# Patient Record
Sex: MALE | Race: WHITE | NOT HISPANIC OR LATINO
[De-identification: names, ages, dates, MRNs, and addresses within clinical notes are randomized per-mention and may not be internally consistent; named-entity substitution may affect disease eponyms.]

---

## 2017-03-29 ENCOUNTER — APPOINTMENT (OUTPATIENT)
Dept: ORTHOPEDIC SURGERY | Facility: CLINIC | Age: 62
End: 2017-03-29

## 2018-05-14 ENCOUNTER — TELEPHONE (OUTPATIENT)
Dept: COLORECTAL SURGERY | Facility: CLINIC | Age: 63
End: 2018-05-14

## 2018-05-14 NOTE — TELEPHONE ENCOUNTER
"Spoke with pt who reports having leakage of stool at night, usually when he drinks alcohol or eats rich food. Pt is not taking cholestyramine, lomotil or fiber supplement regularly. Pt says his BM's are \"all over the place\". Advised pt to try to stick to a regimen and try cholestyramine at least once daily. Advised he could mention this to Dr Whaley at his upcoming C and to call office as needed.    "

## 2018-06-20 ENCOUNTER — ANESTHESIA (OUTPATIENT)
Dept: ENDOSCOPY | Facility: HOSPITAL | Age: 63
End: 2018-06-20
Payer: COMMERCIAL

## 2018-06-20 ENCOUNTER — ANESTHESIA EVENT (OUTPATIENT)
Dept: ENDOSCOPY | Facility: HOSPITAL | Age: 63
End: 2018-06-20
Payer: COMMERCIAL

## 2018-06-20 ENCOUNTER — HOSPITAL ENCOUNTER (OUTPATIENT)
Facility: HOSPITAL | Age: 63
Discharge: HOME | End: 2018-06-20
Attending: COLON & RECTAL SURGERY | Admitting: COLON & RECTAL SURGERY
Payer: COMMERCIAL

## 2018-06-20 VITALS
HEIGHT: 70 IN | TEMPERATURE: 97.5 F | RESPIRATION RATE: 35 BRPM | HEART RATE: 80 BPM | DIASTOLIC BLOOD PRESSURE: 75 MMHG | BODY MASS INDEX: 26.2 KG/M2 | SYSTOLIC BLOOD PRESSURE: 115 MMHG | WEIGHT: 183 LBS | OXYGEN SATURATION: 97 %

## 2018-06-20 PROBLEM — Z85.048 HISTORY OF RECTAL CANCER: Status: ACTIVE | Noted: 2018-06-20

## 2018-06-20 PROCEDURE — 25000000 HC PHARMACY GENERAL: Performed by: NURSE ANESTHETIST, CERTIFIED REGISTERED

## 2018-06-20 PROCEDURE — 45378 DIAGNOSTIC COLONOSCOPY: CPT | Performed by: COLON & RECTAL SURGERY

## 2018-06-20 PROCEDURE — 200200 PR NO CHARGE: Performed by: COLON & RECTAL SURGERY

## 2018-06-20 PROCEDURE — 63600000 HC DRUGS/DETAIL CODE: Performed by: NURSE ANESTHETIST, CERTIFIED REGISTERED

## 2018-06-20 PROCEDURE — 37000002 HC ANESTHESIA MAC: Performed by: COLON & RECTAL SURGERY

## 2018-06-20 PROCEDURE — 75000014 HC COLONSCOPY DIAGNOSTIC: Performed by: COLON & RECTAL SURGERY

## 2018-06-20 PROCEDURE — 0DJD8ZZ INSPECTION OF LOWER INTESTINAL TRACT, VIA NATURAL OR ARTIFICIAL OPENING ENDOSCOPIC: ICD-10-PCS | Performed by: COLON & RECTAL SURGERY

## 2018-06-20 PROCEDURE — 25800000 HC PHARMACY IV SOLUTIONS: Performed by: NURSE ANESTHETIST, CERTIFIED REGISTERED

## 2018-06-20 RX ORDER — IBUPROFEN 200 MG
16-32 TABLET ORAL AS NEEDED
Status: DISCONTINUED | OUTPATIENT
Start: 2018-06-20 | End: 2018-06-20 | Stop reason: HOSPADM

## 2018-06-20 RX ORDER — DEXTROSE 50 % IN WATER (D50W) INTRAVENOUS SYRINGE
25 AS NEEDED
Status: DISCONTINUED | OUTPATIENT
Start: 2018-06-20 | End: 2018-06-20 | Stop reason: HOSPADM

## 2018-06-20 RX ORDER — SODIUM CHLORIDE 9 MG/ML
INJECTION, SOLUTION INTRAVENOUS CONTINUOUS
Status: DISCONTINUED | OUTPATIENT
Start: 2018-06-20 | End: 2018-06-20 | Stop reason: HOSPADM

## 2018-06-20 RX ORDER — PROPOFOL 200MG/20ML
SYRINGE (ML) INTRAVENOUS AS NEEDED
Status: DISCONTINUED | OUTPATIENT
Start: 2018-06-20 | End: 2018-06-20 | Stop reason: SURG

## 2018-06-20 RX ORDER — SODIUM CHLORIDE 9 MG/ML
80 INJECTION, SOLUTION INTRAVENOUS CONTINUOUS
Status: DISCONTINUED | OUTPATIENT
Start: 2018-06-20 | End: 2018-06-20 | Stop reason: HOSPADM

## 2018-06-20 RX ORDER — DEXTROSE 40 %
15-30 GEL (GRAM) ORAL AS NEEDED
Status: DISCONTINUED | OUTPATIENT
Start: 2018-06-20 | End: 2018-06-20 | Stop reason: HOSPADM

## 2018-06-20 RX ORDER — SODIUM CHLORIDE 9 MG/ML
INJECTION, SOLUTION INTRAVENOUS CONTINUOUS PRN
Status: DISCONTINUED | OUTPATIENT
Start: 2018-06-20 | End: 2018-06-20 | Stop reason: SURG

## 2018-06-20 RX ORDER — PROPOFOL 10 MG/ML
INJECTION, EMULSION INTRAVENOUS CONTINUOUS PRN
Status: DISCONTINUED | OUTPATIENT
Start: 2018-06-20 | End: 2018-06-20 | Stop reason: SURG

## 2018-06-20 RX ORDER — LIDOCAINE HYDROCHLORIDE 10 MG/ML
INJECTION, SOLUTION EPIDURAL; INFILTRATION; INTRACAUDAL; PERINEURAL AS NEEDED
Status: DISCONTINUED | OUTPATIENT
Start: 2018-06-20 | End: 2018-06-20 | Stop reason: SURG

## 2018-06-20 RX ADMIN — PROPOFOL 50 MG: 10 INJECTION, EMULSION INTRAVENOUS at 07:40

## 2018-06-20 RX ADMIN — PROPOFOL 50 MG: 10 INJECTION, EMULSION INTRAVENOUS at 07:38

## 2018-06-20 RX ADMIN — LIDOCAINE HYDROCHLORIDE 5 ML: 10 INJECTION, SOLUTION EPIDURAL; INFILTRATION; INTRACAUDAL; PERINEURAL at 07:38

## 2018-06-20 RX ADMIN — SODIUM CHLORIDE: 9 INJECTION, SOLUTION INTRAVENOUS at 07:33

## 2018-06-20 RX ADMIN — PROPOFOL 50 MG: 10 INJECTION, EMULSION INTRAVENOUS at 07:39

## 2018-06-20 RX ADMIN — PROPOFOL 175 MCG/KG/MIN: 10 INJECTION, EMULSION INTRAVENOUS at 07:39

## 2018-06-20 ASSESSMENT — ENCOUNTER SYMPTOMS
FEVER: 0
DIFFICULTY URINATING: 0
ABDOMINAL PAIN: 0
HEADACHES: 0
CONFUSION: 0
SHORTNESS OF BREATH: 0
CHILLS: 0

## 2018-06-20 ASSESSMENT — PAIN SCALES - GENERAL: PAIN_LEVEL: 0

## 2018-06-20 ASSESSMENT — PAIN - FUNCTIONAL ASSESSMENT
PAIN_FUNCTIONAL_ASSESSMENT: UNABLE TO ASSESS
PAIN_FUNCTIONAL_ASSESSMENT: NO/DENIES PAIN
PAIN_FUNCTIONAL_ASSESSMENT: NO/DENIES PAIN

## 2018-06-20 NOTE — ANESTHESIA PREPROCEDURE EVALUATION
Anesthesia ROS/MED HX      Cardiovascular   hypertension  GI/Hepatic   GERD  Endo/Other   History of cancer (rectal  cancer )  ROS/MED HX Comments:    GI/Hepatic/Renal: Hx rectal cancer , s/p resection     IBS       Past Surgical History:   Procedure Laterality Date   • COLON SURGERY  07/14/2005   • COLON SURGERY  10/10/2005    closure of loop transverse colostomy with resection of portion of transverse colon.   • COLON SURGERY  08/23/2006   • COLON SURGERY  08/08/2007   • COLON SURGERY  08/13/2008   • COLON SURGERY  08/12/2009   • COLON SURGERY  03/16/2011   • COLON SURGERY  10/31/2011    excision of hemorrhoids, flexible sigmoidscopy   • COLON SURGERY  04/03/2013   • COLON SURGERY  04/08/2015   • COLONOSCOPY         Physical Exam    Airway   Mallampati: III   TM distance: >3 FB   Neck ROM: full  Cardiovascular    Rhythm: regular   Rate: normal  Pulmonary    Decreased breath sounds  Dental - normal        Anesthesia Plan    Plan: MAC    Technique: MAC     Airway: natural airway / supplemental oxygen   ASA 3  Anesthetic plan and risks discussed with: patient  Induction:    intravenous   Parents Present: No  Postop Plan:   Patient Disposition: phase II then home   Pain Management: IV analgesics      Past Medical History:   Diagnosis Date   • GERD (gastroesophageal reflux disease)    • Hypertension    • IBS (irritable bowel syndrome)    • Rectal cancer (CMS/HCC) (HCC) 2008    chemo

## 2018-06-20 NOTE — POST-PROCEDURE NOTE
Preoperative diagnosis: T3 adenocarcinoma the rectum 1 cm posterior status post preoperative chemoradiation (5040 cGy) YPT2N0 lesion.    Postoperative diagnosis: Same no evidence of disease    Surgeon: Angel Whaley MD    Procedure: Colonoscopy based cecum    DD: 2    Findings: Normal coloanal anastomosis; normal colocolonic anastomosis    Followup: Follow-up in the office in one years time repeat colonoscopy in 3 years to

## 2018-06-20 NOTE — ANESTHESIA POSTPROCEDURE EVALUATION
Patient: Sumit Davis    Procedure Summary     Date:  06/20/18 Room / Location:  [facility] (D) / [facility]    Anesthesia Start:  0734 Anesthesia Stop:  0757    Procedure:  colonoscopy (N/A Anus) Diagnosis:       Encounter for screening colonoscopy      (screening)    Provider:  Angel Whaley MD Responsible Provider:  Wander Lopez MD    Anesthesia Type:  MAC ASA Status:  3          Anesthesia Type: MAC  PACU Vitals  6/20/2018 0752 - 6/20/2018 0805      6/20/2018 0755             BP: 94/60    Temp: 36.4 °C (97.5 °F)    Pulse: 80    Resp: 17    SpO2: 99 %            Anesthesia Post Evaluation    Pain score: 0  Pain management: satisfactory to patient  Mode of pain management: IV medication  Patient location during evaluation: PACU  Patient participation: complete - patient participated  Level of consciousness: awake and alert  Cardiovascular status: acceptable  Airway Patency: adequate  Respiratory status: acceptable  Hydration status: stable  Anesthetic complications: no

## 2018-06-20 NOTE — OP NOTE
_______________________________________________________________________________  Patient Name: Sumit Davis         Procedure Date: 6/20/2018 7:28 AM  MRN: [id number]                     Account Number: [id number]  YOB: 1955              Age: 63  Gender: Male                          Note Status: Finalized  Attending MD: SARAHI MELÉNDEZ MD  _______________________________________________________________________________  Procedure:           Colonoscopy  Indications:         Personal history of malignant rectal neoplasm  Providers:           Bj Schuster RN (Nurse), SARAHI MELÉNDEZ MD (Doctor)  Referring MD:  Requesting Provider:  Medicines:           Monitored Anesthesia Care  Complications:       No immediate complications.  _______________________________________________________________________________  Procedure:           After I obtained informed consent, the scope was passed  under direct vision. Throughout the procedure, the  patient's blood pressure, pulse, and oxygen saturations  were monitored continuously. The Colonoscope was  introduced through the anus and advanced to the cecum,  identified by appendiceal orifice and ileocecal valve.  The colonoscopy was performed without difficulty. The  patient tolerated the procedure well. The quality of the  bowel preparation was good.  Findings:  coloanal Anastomosis Normal without evidence of recurrence  transverse colon Anastomosis Normal without evidence of recurrence  The entire examined colon appeared normal.  Impression:          - The entire examined colon is normal.  - No specimens collected.  Recommendation:      - Discharge patient to home.  - Return to my office in 1 year.  Procedure Code(s):   --- Professional ---  89041, Colonoscopy, flexible; diagnostic, including  collection of specimen(s) by brushing or washing, when  performed (separate procedure)  Diagnosis Code(s):   --- Professional ---  Z85.048, Personal history of other  malignant neoplasm of  rectum, rectosigmoid junction, and anus  CPT copyright 2015 [employer]. All rights reserved.  The codes documented in this report are preliminary and upon  review may  be revised to meet current compliance requirements.  Attending Participation:  I personally performed the entire procedure.  Sarahi Meléndez MD  ________________  SARAHI MELÉNDEZ MD  6/20/2018 8:03:28 AM  Number of Addenda: 0  Note Initiated On: 6/20/2018 7:28 AM

## 2018-06-20 NOTE — H&P
HISTORY & PHYSICAL EXAM  CC: Colonoscopy for history of rectal cancer    HPI: History of ROSA in 2005 - ypT2N0. Last colonoscopy in 2015 - had no polyps at that point. Has some issues with control. Not taking any fiber or lomotil or other similar agents. Prefers not to take these. Has anywhere from 0 to 7 bowel movements per day.     Past Medical History:   Diagnosis Date   • GERD (gastroesophageal reflux disease)    • Hypertension    • IBS (irritable bowel syndrome)    • Rectal cancer (CMS/HCC) (HCC) 2008    chemo        Past Surgical History:   Procedure Laterality Date   • COLON SURGERY  07/14/2005   • COLON SURGERY  10/10/2005    closure of loop transverse colostomy with resection of portion of transverse colon.   • COLON SURGERY  08/23/2006   • COLON SURGERY  08/08/2007   • COLON SURGERY  08/13/2008   • COLON SURGERY  08/12/2009   • COLON SURGERY  03/16/2011   • COLON SURGERY  10/31/2011    excision of hemorrhoids, flexible sigmoidscopy   • COLON SURGERY  04/03/2013   • COLON SURGERY  04/08/2015   • COLONOSCOPY       No current facility-administered medications for this encounter.    Allergies   Allergen Reactions   • Aspirin Hives     Social History     Social History   • Marital status:      Spouse name: N/A   • Number of children: N/A   • Years of education: N/A     Occupational History   • Not on file.     Social History Main Topics   • Smoking status: Never Smoker   • Smokeless tobacco: Never Used   • Alcohol use Yes      Comment: socially   • Drug use: No   • Sexual activity: Not on file     Other Topics Concern   • Not on file     Social History Narrative   • No narrative on file     History reviewed. No pertinent family history.  Review of Systems   Constitutional: Negative for chills and fever.   HENT: Negative for dental problem.    Respiratory: Negative for shortness of breath.    Cardiovascular: Negative for chest pain.   Gastrointestinal: Negative for abdominal pain.   Genitourinary:  Negative for difficulty urinating.   Musculoskeletal: Negative for gait problem.   Skin: Negative for rash.   Neurological: Negative for headaches.   Psychiatric/Behavioral: Negative for confusion.     Physical Exam   Constitutional: He is oriented to person, place, and time. He appears well-developed and well-nourished.   HENT:   Head: Normocephalic and atraumatic.   Eyes: No scleral icterus.   Cardiovascular: Normal rate, regular rhythm, normal heart sounds and intact distal pulses.    No murmur heard.  Pulmonary/Chest: Effort normal and breath sounds normal. No respiratory distress. He has no wheezes. He has no rales.   Abdominal: Soft. He exhibits no distension.   Musculoskeletal: He exhibits no edema.   Neurological: He is alert and oriented to person, place, and time.   Skin: Skin is warm and dry.   Psychiatric: He has a normal mood and affect.       XRAYS: None    LABS: CEA < 2 in October 2017. (we do not have records of this - per patient)     ASSESSMENT/PLAN:   Ready for colonoscopy.  Indication is rectal cancer history.    I, Lenard Graves MD, am scribing for, and in the presence of MD Lenard Saavedra MD  [facility] Colorectal Clinical Fellow

## 2020-10-15 ENCOUNTER — TELEPHONE (OUTPATIENT)
Dept: COLORECTAL SURGERY | Facility: CLINIC | Age: 65
End: 2020-10-15

## 2020-10-15 NOTE — TELEPHONE ENCOUNTER
Received voicemail message from patient's wife requesting a call back.  Spoke to patient's wife on the phone who states she is upset patient has been rescheduled from Dr Whaley' schedule to Dr Pitts's schedule because patient has a history with Dr Whaley and patient does not know Dr Pitts.  Patient's wife informed Dr Lord is Dr Whaley' partner and is very good but will discuss above message with doctors and our PA and will get back to her regarding patient's appointment. Patient's wife verbalized understanding and thanked me for the call.

## 2020-10-16 NOTE — TELEPHONE ENCOUNTER
Message relayed to FELIBERTO Lisa, regarding patient would like to be scheduled with Dr Whaley. Per FELIBERTO Lisa, patient should be offered new appointment with Dr Whaley.  Message relayed to  to call patient's wife to reschedule with Dr Whaley.  Per , patient's wife was called and offered new appointment with Dr Whaley but patient's wife states patient unable to make that appointment and would like to keep scheduled appointment with Dr Pitts on 11/2/2020.

## 2020-10-29 ENCOUNTER — DOCUMENTATION (OUTPATIENT)
Dept: COLORECTAL SURGERY | Facility: CLINIC | Age: 65
End: 2020-10-29

## 2020-10-29 NOTE — PROGRESS NOTES
1      2             3              4             5     LAST APPOINTMENT:  04/08/2015     TIME ARRIVED:     TIME ROOMED:    VISIT TYPE:   NP       POV     EPV     DISC:             YES         NO                                   PREPPED:  YES  NO     LABS:  [facility]  [facility]  [facility]  OTHER      PHARMACY ENTERED:  YES   NO     DIAGNOSIS:  Rectal cancer  SURGERY                                                                                           DATE OF SURGERY     PATHOLOGY STAGE:   RADIATION :    Yes   No                                           DOSAGE:     LOCATION:   Anterior   Right Lateral   Left Lateral   Posterior   Circumferential     LEVEL:                        SIZE:    CT SCAN:   FFC:  06/20/2018  FFS:    CEA:    PET:    MRI:      OTHER:       FIBER:  NO  METAMUCIL  KONSYL  CITYRUCEL   FIBERCON  BENEFIBER OTHER  LOMOTIL:    NO    1  VS   2    QD    BID    TID    QID  ZANTAC:       Y / N  AMPHOGEL:    Y / N                                                                          START / STOP                                                 RAD ONC:                                  N / A  MED ONC:                               N / A

## 2020-11-02 ENCOUNTER — OFFICE VISIT (OUTPATIENT)
Dept: COLORECTAL SURGERY | Facility: CLINIC | Age: 65
End: 2020-11-02
Payer: COMMERCIAL

## 2020-11-02 ENCOUNTER — LAB REQUISITION (OUTPATIENT)
Dept: LAB | Facility: HOSPITAL | Age: 65
End: 2020-11-02
Attending: COLON & RECTAL SURGERY
Payer: COMMERCIAL

## 2020-11-02 VITALS
DIASTOLIC BLOOD PRESSURE: 92 MMHG | HEIGHT: 70 IN | BODY MASS INDEX: 27.2 KG/M2 | TEMPERATURE: 98 F | WEIGHT: 190 LBS | SYSTOLIC BLOOD PRESSURE: 138 MMHG

## 2020-11-02 DIAGNOSIS — Z85.048 PERSONAL HISTORY OF OTHER MALIGNANT NEOPLASM OF RECTUM, RECTOSIGMOID JUNCTION, AND ANUS: ICD-10-CM

## 2020-11-02 DIAGNOSIS — Z85.048 HISTORY OF RECTAL CANCER: Primary | ICD-10-CM

## 2020-11-02 PROCEDURE — 99214 OFFICE O/P EST MOD 30 MIN: CPT | Mod: 25 | Performed by: COLON & RECTAL SURGERY

## 2020-11-02 PROCEDURE — 45331 SIGMOIDOSCOPY AND BIOPSY: CPT | Performed by: COLON & RECTAL SURGERY

## 2020-11-02 PROCEDURE — 88305 TISSUE EXAM BY PATHOLOGIST: CPT | Performed by: COLON & RECTAL SURGERY

## 2020-11-02 RX ORDER — ICOSAPENT ETHYL 1000 MG/1
1 CAPSULE ORAL
COMMUNITY
Start: 2020-10-07

## 2020-11-02 NOTE — PROGRESS NOTES
HISTORY & PHYSICAL EXAM    HPI: Patient to be seen today in the office for surveillance.  Is been doing very well since his laparoscopic tot with the colonic pouch anal anastomosis done in 2005.  He had a diverting transverse loop colostomy which was later reversed and later 2005.  He said colonoscopies every 3 years since he has been doing well.  He has not been seen in a couple of years time.  His bowels are exactly the way they always have been, he has fairly good control most times.  He is moving his bowels on a daily basis usually.  He has not seen any blood.  He was here today in the presence of his wife.  He was questioning along with his wife regarding the timing of colonoscopies and follow-up visits.    Past Medical History:   Diagnosis Date   • GERD (gastroesophageal reflux disease)    • Hypertension    • IBS (irritable bowel syndrome)    • Rectal cancer (CMS/HCC) 2008    chemo        Past Surgical History:   Procedure Laterality Date   • COLON SURGERY  07/14/2005    ROSA   • COLON SURGERY  10/10/2005    Clousure loop colostomy   • COLON SURGERY  08/23/2006    Colonoscopy   • COLON SURGERY  08/08/2007    Colonoscopy   • COLON SURGERY  08/13/2008    Colonoscopy   • COLON SURGERY  08/12/2009    Colonoscopy   • COLON SURGERY  03/16/2011    Colonoscopy   • COLON SURGERY  10/31/2011    Flexible Sigmoid   • COLON SURGERY  04/03/2013    Colonoscopy   • COLON SURGERY  04/08/2015    Colonoscopy   • COLONOSCOPY         Current Outpatient Medications:   •  dicyclomine (BENTYL) 10 mg capsule, Take 10 mg by mouth 4 (four) times a day (before meals and nightly)., Disp: , Rfl:   •  diphenoxylate-atropine (LOMOTIL) 2.5-0.025 mg per tablet, Take 1 tablet by mouth 4 (four) times a day as needed for diarrhea., Disp: , Rfl:   •  nebivolol (BYSTOLIC) 5 mg tablet, Take 5 mg by mouth daily., Disp: , Rfl:   •  chlorthalidone (HYGROTEN) 25 mg tablet, Take 25 mg by mouth daily., Disp: , Rfl:   •  potassium chloride (KLOR-CON) 10  mEq CR tablet, Take 10 mEq by mouth 2 (two) times a day., Disp: , Rfl:   •  VASCEPA 1 gram capsule, Take 1 g by mouth 2 (two) times a day., Disp: , Rfl:    Allergies   Allergen Reactions   • Aspirin Hives     Social History     Socioeconomic History   • Marital status:      Spouse name: Not on file   • Number of children: Not on file   • Years of education: Not on file   • Highest education level: Not on file   Occupational History   • Not on file   Social Needs   • Financial resource strain: Not on file   • Food insecurity     Worry: Not on file     Inability: Not on file   • Transportation needs     Medical: Not on file     Non-medical: Not on file   Tobacco Use   • Smoking status: Never Smoker   • Smokeless tobacco: Never Used   Substance and Sexual Activity   • Alcohol use: Yes     Comment: socially   • Drug use: No   • Sexual activity: Not on file   Lifestyle   • Physical activity     Days per week: Not on file     Minutes per session: Not on file   • Stress: Not on file   Relationships   • Social connections     Talks on phone: Not on file     Gets together: Not on file     Attends Faith service: Not on file     Active member of club or organization: Not on file     Attends meetings of clubs or organizations: Not on file     Relationship status: Not on file   • Intimate partner violence     Fear of current or ex partner: Not on file     Emotionally abused: Not on file     Physically abused: Not on file     Forced sexual activity: Not on file   Other Topics Concern   • Not on file   Social History Narrative   • Not on file      History reviewed. No pertinent family history.    REVIEW OF SYSTEMS: Unchanged    PHYSICAL EXAM:  GEN: On examination the patient is resting comfortably.  NEURO/PSYCH: Alert and oriented ×3  HEENT: Eyes: Sclera anicteric  CHEST: Equal rise and fall the chest.  No tenderness bilaterally.  SKIN: Warm and moist  NODES: No groin or cervical lymphadenopathy  EXT: No palpable edema  of the bilateral lower extremities.  No clubbing.  GI: Abdomen soft, nontender, nondistended.  No palpable liver or spleen edge.  No ventral hernias, mass, or tenderness.  RECTAL: Perianal skin is normal.  Digital exam reveals normal sphincter tone, the anastomosis palpable, well-healed, without any palpable abnormality.  Flexible sigmoidoscopy: Carried out to the level of the transverse colon at the anastomosis of the stoma closure takedown.  At this point was limited by stool.  The anastomosis appeared normal, widely patent, without abnormality.  Scope was withdrawn looking for any lesions in the very distal rectum there was a polyp seen that was removed with cold forceps polypectomy.  It was 5 mm in size and was sessile.  He tolerated this well and the specimen was sent off the field for pathology.  The anastomosis appeared normal and without any evidence of recurrence.      ASSESSMENT/PLAN:     History of rectal cancer  Patient has a history of rectal cancer in the past I resected 15 years ago.  His most recent colonoscopy was in June 2018 which was normal.  Today he was found to have a polyp in the rectum which I removed with cold forceps polypectomy during the flexible sigmoidoscopy.  Depending on the results of this, will depend on the recommendation for repeat colonoscopy.  5-year follow-up does not seem unreasonable which will be in 2023.  That said, if he had an adenomatous polyp today, I would have him scoped in 2 years time.  He should follow-up in the office in 2 years time.

## 2020-11-02 NOTE — LETTER
November 2, 2020     Bradly Dawson MD  [address]  [place] [address]    Patient: Sumit Davis  YOB: 1955  Date of Visit: 11/2/2020      Dear Dr. Dawson:    Thank you for referring Sumit Davis to me for evaluation. Below are my notes for this consultation.    If you have questions, please do not hesitate to call me. I look forward to following your patient along with you.         Sincerely,        Gabriel Pitts MD        CC: No Recipients  Gabriel Pitts MD  11/2/2020  3:15 PM  Signed  HISTORY & PHYSICAL EXAM    HPI: Patient to be seen today in the office for surveillance.  Is been doing very well since his laparoscopic tot with the colonic pouch anal anastomosis done in 2005.  He had a diverting transverse loop colostomy which was later reversed and later 2005.  He said colonoscopies every 3 years since he has been doing well.  He has not been seen in a couple of years time.  His bowels are exactly the way they always have been, he has fairly good control most times.  He is moving his bowels on a daily basis usually.  He has not seen any blood.  He was here today in the presence of his wife.  He was questioning along with his wife regarding the timing of colonoscopies and follow-up visits.    Past Medical History:   Diagnosis Date   • GERD (gastroesophageal reflux disease)    • Hypertension    • IBS (irritable bowel syndrome)    • Rectal cancer (CMS/HCC) 2008    chemo        Past Surgical History:   Procedure Laterality Date   • COLON SURGERY  07/14/2005    ROSA   • COLON SURGERY  10/10/2005    Clousure loop colostomy   • COLON SURGERY  08/23/2006    Colonoscopy   • COLON SURGERY  08/08/2007    Colonoscopy   • COLON SURGERY  08/13/2008    Colonoscopy   • COLON SURGERY  08/12/2009    Colonoscopy   • COLON SURGERY  03/16/2011    Colonoscopy   • COLON SURGERY  10/31/2011    Flexible Sigmoid   • COLON SURGERY  04/03/2013    Colonoscopy   • COLON SURGERY  04/08/2015     Colonoscopy   • COLONOSCOPY         Current Outpatient Medications:   •  dicyclomine (BENTYL) 10 mg capsule, Take 10 mg by mouth 4 (four) times a day (before meals and nightly)., Disp: , Rfl:   •  diphenoxylate-atropine (LOMOTIL) 2.5-0.025 mg per tablet, Take 1 tablet by mouth 4 (four) times a day as needed for diarrhea., Disp: , Rfl:   •  nebivolol (BYSTOLIC) 5 mg tablet, Take 5 mg by mouth daily., Disp: , Rfl:   •  chlorthalidone (HYGROTEN) 25 mg tablet, Take 25 mg by mouth daily., Disp: , Rfl:   •  potassium chloride (KLOR-CON) 10 mEq CR tablet, Take 10 mEq by mouth 2 (two) times a day., Disp: , Rfl:   •  VASCEPA 1 gram capsule, Take 1 g by mouth 2 (two) times a day., Disp: , Rfl:    Allergies   Allergen Reactions   • Aspirin Hives     Social History     Socioeconomic History   • Marital status:      Spouse name: Not on file   • Number of children: Not on file   • Years of education: Not on file   • Highest education level: Not on file   Occupational History   • Not on file   Social Needs   • Financial resource strain: Not on file   • Food insecurity     Worry: Not on file     Inability: Not on file   • Transportation needs     Medical: Not on file     Non-medical: Not on file   Tobacco Use   • Smoking status: Never Smoker   • Smokeless tobacco: Never Used   Substance and Sexual Activity   • Alcohol use: Yes     Comment: socially   • Drug use: No   • Sexual activity: Not on file   Lifestyle   • Physical activity     Days per week: Not on file     Minutes per session: Not on file   • Stress: Not on file   Relationships   • Social connections     Talks on phone: Not on file     Gets together: Not on file     Attends Gnosticist service: Not on file     Active member of club or organization: Not on file     Attends meetings of clubs or organizations: Not on file     Relationship status: Not on file   • Intimate partner violence     Fear of current or ex partner: Not on file     Emotionally abused: Not on file      Physically abused: Not on file     Forced sexual activity: Not on file   Other Topics Concern   • Not on file   Social History Narrative   • Not on file      History reviewed. No pertinent family history.    REVIEW OF SYSTEMS: Unchanged    PHYSICAL EXAM:  GEN: On examination the patient is resting comfortably.  NEURO/PSYCH: Alert and oriented ×3  HEENT: Eyes: Sclera anicteric  CHEST: Equal rise and fall the chest.  No tenderness bilaterally.  SKIN: Warm and moist  NODES: No groin or cervical lymphadenopathy  EXT: No palpable edema of the bilateral lower extremities.  No clubbing.  GI: Abdomen soft, nontender, nondistended.  No palpable liver or spleen edge.  No ventral hernias, mass, or tenderness.  RECTAL: Perianal skin is normal.  Digital exam reveals normal sphincter tone, the anastomosis palpable, well-healed, without any palpable abnormality.  Flexible sigmoidoscopy: Carried out to the level of the transverse colon at the anastomosis of the stoma closure takedown.  At this point was limited by stool.  The anastomosis appeared normal, widely patent, without abnormality.  Scope was withdrawn looking for any lesions in the very distal rectum there was a polyp seen that was removed with cold forceps polypectomy.  It was 5 mm in size and was sessile.  He tolerated this well and the specimen was sent off the field for pathology.  The anastomosis appeared normal and without any evidence of recurrence.      ASSESSMENT/PLAN:     History of rectal cancer  Patient has a history of rectal cancer in the past I resected 15 years ago.  His most recent colonoscopy was in June 2018 which was normal.  Today he was found to have a polyp in the rectum which I removed with cold forceps polypectomy during the flexible sigmoidoscopy.  Depending on the results of this, will depend on the recommendation for repeat colonoscopy.  5-year follow-up does not seem unreasonable which will be in 2023.  That said, if he had an adenomatous  polyp today, I would have him scoped in 2 years time.  He should follow-up in the office in 2 years time.

## 2020-11-02 NOTE — ASSESSMENT & PLAN NOTE
Patient has a history of rectal cancer in the past I resected 15 years ago.  His most recent colonoscopy was in June 2018 which was normal.  Today he was found to have a polyp in the rectum which I removed with cold forceps polypectomy during the flexible sigmoidoscopy.  Depending on the results of this, will depend on the recommendation for repeat colonoscopy.  5-year follow-up does not seem unreasonable which will be in 2023.  That said, if he had an adenomatous polyp today, I would have him scoped in 2 years time.  He should follow-up in the office in 2 years time.

## 2020-11-03 LAB
CASE RPRT: NORMAL
CLINICAL INFO: NORMAL
PATH REPORT.FINAL DX SPEC: NORMAL
PATH REPORT.GROSS SPEC: NORMAL

## 2020-11-06 ENCOUNTER — TELEPHONE (OUTPATIENT)
Dept: COLORECTAL SURGERY | Facility: CLINIC | Age: 65
End: 2020-11-06

## 2020-11-06 NOTE — TELEPHONE ENCOUNTER
Received voice message from patient requesting pathology results from biopsy that was taken on Monday during his FFS in the office. Called patient back to inform him that the biopsy came back as benign. Patient verbalized understanding.

## 2022-01-05 ENCOUNTER — TELEPHONE (OUTPATIENT)
Dept: COLORECTAL SURGERY | Facility: CLINIC | Age: 67
End: 2022-01-05
Payer: COMMERCIAL

## 2022-01-05 NOTE — TELEPHONE ENCOUNTER
Spoke with Maria Del Rosario who stated Sumit is going to the bathroom with diarrhea multiple times a day since surgery since surgery in 2005 and has no control, please call to discuss [phone].

## 2022-01-07 NOTE — TELEPHONE ENCOUNTER
Spoke w. Pt wife regarding her concerns over Mr. Simran wells. She states he still experiences episodes of clustering frequently, he does not have accidents but feels he needs to be aware of where a bathroom is incase he has an episode. Pt does take a daily fiber supplement, only takes lomotil for emergencies. She is most concerned over jury duty that he has recently been selected for. Informed her I will again speak with Dr. Pitts regarding jury duty. Encouraged them to make an appointment to be seen in the office as this seems to be a change from last office visit where pt reported good control and habits. She stated they will take that into account as it is a trip for them .

## 2022-09-12 ENCOUNTER — TELEPHONE (OUTPATIENT)
Dept: COLORECTAL SURGERY | Facility: CLINIC | Age: 67
End: 2022-09-12

## 2022-09-12 NOTE — TELEPHONE ENCOUNTER
Patient called wanting to schedule 10/23 of next year.  Schedule not open for next year.  Advised patient to call me back in 10/22 to schedule for 10/23

## 2023-11-08 NOTE — H&P
General Surgery History and Physical    Diagnosis: Screening for colon cancer [Z12.11].    HPI    Patient with history of yp T2N0 rectal cancer sp lap ROSA in 2005 with diverting loop colostomy reversed in 2005. Last colonoscopy 11/2020 (Dr. Pitts) with rectal polyp removed showing hyperplastic change. Planned for repeat in 2-3 years.   Medical History:   Past Medical History:   Diagnosis Date   • GERD (gastroesophageal reflux disease)    • Hypertension    • IBS (irritable bowel syndrome)    • Rectal cancer (CMS/HCC) 2008    chemo          Surgical History:   Past Surgical History:   Procedure Laterality Date   • COLON SURGERY  07/14/2005    ROSA   • COLON SURGERY  10/10/2005    Clousure loop colostomy   • COLON SURGERY  08/23/2006    Colonoscopy   • COLON SURGERY  08/08/2007    Colonoscopy   • COLON SURGERY  08/13/2008    Colonoscopy   • COLON SURGERY  08/12/2009    Colonoscopy   • COLON SURGERY  03/16/2011    Colonoscopy   • COLON SURGERY  10/31/2011    Flexible Sigmoid   • COLON SURGERY  04/03/2013    Colonoscopy   • COLON SURGERY  04/08/2015    Colonoscopy   • COLONOSCOPY         Social History:   Social History     Socioeconomic History   • Marital status:    Tobacco Use   • Smoking status: Never   • Smokeless tobacco: Never   Substance and Sexual Activity   • Alcohol use: Yes     Comment: socially   • Drug use: No       Family History: No family history on file.    Allergies: Aspirin    Home Medications:      Current Medications:  •  chlorthalidone  •  dicyclomine  •  diphenoxylate-atropine  •  nebivoloL  •  potassium chloride  •  VASCEPA    Review of Systems  Pertinent items are noted in HPI.    Objective     Vital Signs for the last 24 hours:  BP: ()/()   Arterial Line BP: ()/()     Physicial Exam    General appearance: alert, appears stated age and cooperative  Head: normocephalic, without obvious abnormality, atraumatic  Eyes: conjunctivae/corneas clear. PERRL, EOM's intact. Fundi  benign.  Ears: normal TM's and external ear canals both ears  Nose: Nares normal. Septum midline. Mucosa normal. No drainage or sinus tenderness.  Throat: lips, mucosa, and tongue normal; teeth and gums normal  Neck: no adenopathy, no carotid bruit, no JVD, supple, symmetrical, trachea midline and thyroid not enlarged, symmetric, no tenderness/mass/nodules  Back: symmetric, no curvature. ROM normal. No CVA tenderness.  Lungs: clear to auscultation bilaterally  Chest wall: no tenderness  Heart: regular rate and rhythm, S1, S2 normal, no murmur, click, rub or gallop  Abdomen: soft, non-tender; bowel sounds normal; no masses, no organomegaly  Extremities: extremities normal, warm and well-perfused; no cyanosis, clubbing, or edema  Pulses: 2+ and symmetric  Skin: Skin color, texture, turgor normal. No rashes or lesions  Lymph nodes: Cervical, supraclavicular, and axillary nodes normal.  Neurologic: Grossly normal      Labs  No new labs.    Imaging  I have reviewed the Imaging from the last 24 hrs.        Assessment/Plan     Code Status: No Order

## 2023-11-14 ENCOUNTER — ANESTHESIA EVENT (OUTPATIENT)
Dept: ENDOSCOPY | Facility: HOSPITAL | Age: 68
End: 2023-11-14
Payer: COMMERCIAL

## 2023-11-14 NOTE — ANESTHESIA PREPROCEDURE EVALUATION
Relevant Problems   No relevant active problems       ROS/Med Hx     Past Surgical History:   Procedure Laterality Date    COLON SURGERY  07/14/2005    ROSA    COLON SURGERY  10/10/2005    Clousure loop colostomy    COLON SURGERY  08/23/2006    Colonoscopy    COLON SURGERY  08/08/2007    Colonoscopy    COLON SURGERY  08/13/2008    Colonoscopy    COLON SURGERY  08/12/2009    Colonoscopy    COLON SURGERY  03/16/2011    Colonoscopy    COLON SURGERY  10/31/2011    Flexible Sigmoid    COLON SURGERY  04/03/2013    Colonoscopy    COLON SURGERY  04/08/2015    Colonoscopy    COLONOSCOPY       Patient with history of yp T2N0 rectal cancer sp lap ROSA in 2005 with diverting loop colostomy reversed in 2005. Last colonoscopy 11/2020 (Dr. Pitts) with rectal polyp removed showing hyperplastic change. Planned for repeat in 2-3 years.   Physical Exam    Airway   Mallampati: III   TM distance: >3 FB   Neck ROM: full  Cardiovascular    Rhythm: regular   Rate: normal      Anesthesia Plan    Plan: MAC    Technique: MAC     Airway: natural airway / supplemental oxygen   2 ASA  Anesthetic plan and risks discussed with: patient  Induction:    intravenous   Postop Plan:   Patient Disposition: phase II then home   Pain Management: IV analgesics  Comments:    Plan: Mac with GA backup

## 2023-11-15 ENCOUNTER — ANESTHESIA (OUTPATIENT)
Dept: ENDOSCOPY | Facility: HOSPITAL | Age: 68
End: 2023-11-15
Payer: COMMERCIAL

## 2023-11-15 ENCOUNTER — HOSPITAL ENCOUNTER (OUTPATIENT)
Facility: HOSPITAL | Age: 68
Discharge: HOME | End: 2023-11-15
Attending: COLON & RECTAL SURGERY | Admitting: COLON & RECTAL SURGERY
Payer: COMMERCIAL

## 2023-11-15 VITALS
HEIGHT: 70 IN | TEMPERATURE: 96.5 F | SYSTOLIC BLOOD PRESSURE: 136 MMHG | HEART RATE: 80 BPM | RESPIRATION RATE: 23 BRPM | OXYGEN SATURATION: 99 % | WEIGHT: 190 LBS | DIASTOLIC BLOOD PRESSURE: 76 MMHG | BODY MASS INDEX: 27.2 KG/M2

## 2023-11-15 PROCEDURE — 63600000 HC DRUGS/DETAIL CODE: Mod: JW | Performed by: NURSE ANESTHETIST, CERTIFIED REGISTERED

## 2023-11-15 PROCEDURE — 0DJD8ZZ INSPECTION OF LOWER INTESTINAL TRACT, VIA NATURAL OR ARTIFICIAL OPENING ENDOSCOPIC: ICD-10-PCS | Performed by: COLON & RECTAL SURGERY

## 2023-11-15 PROCEDURE — 25800000 HC PHARMACY IV SOLUTIONS: Performed by: NURSE ANESTHETIST, CERTIFIED REGISTERED

## 2023-11-15 PROCEDURE — 71000011 HC PACU PHASE 1 EA ADDL MIN: Performed by: COLON & RECTAL SURGERY

## 2023-11-15 PROCEDURE — 75000014 HC COLONSCOPY DIAGNOSTIC: Performed by: COLON & RECTAL SURGERY

## 2023-11-15 PROCEDURE — 71000001 HC PACU PHASE 1 INITIAL 30MIN: Performed by: COLON & RECTAL SURGERY

## 2023-11-15 PROCEDURE — 45378 DIAGNOSTIC COLONOSCOPY: CPT | Performed by: COLON & RECTAL SURGERY

## 2023-11-15 PROCEDURE — 37000002 HC ANESTHESIA MAC: Performed by: COLON & RECTAL SURGERY

## 2023-11-15 PROCEDURE — 25000000 HC PHARMACY GENERAL: Performed by: NURSE ANESTHETIST, CERTIFIED REGISTERED

## 2023-11-15 RX ORDER — LIDOCAINE HYDROCHLORIDE 20 MG/ML
INJECTION, SOLUTION EPIDURAL; INFILTRATION; INTRACAUDAL; PERINEURAL AS NEEDED
Status: DISCONTINUED | OUTPATIENT
Start: 2023-11-15 | End: 2023-11-15 | Stop reason: SURG

## 2023-11-15 RX ORDER — SODIUM CHLORIDE 9 MG/ML
INJECTION, SOLUTION INTRAVENOUS CONTINUOUS PRN
Status: DISCONTINUED | OUTPATIENT
Start: 2023-11-15 | End: 2023-11-15 | Stop reason: SURG

## 2023-11-15 RX ORDER — PROPOFOL 10 MG/ML
INJECTION, EMULSION INTRAVENOUS CONTINUOUS PRN
Status: DISCONTINUED | OUTPATIENT
Start: 2023-11-15 | End: 2023-11-15 | Stop reason: SURG

## 2023-11-15 RX ORDER — PROPOFOL 200MG/20ML
SYRINGE (ML) INTRAVENOUS AS NEEDED
Status: DISCONTINUED | OUTPATIENT
Start: 2023-11-15 | End: 2023-11-15 | Stop reason: SURG

## 2023-11-15 RX ADMIN — SODIUM CHLORIDE: 9 INJECTION, SOLUTION INTRAVENOUS at 07:30

## 2023-11-15 RX ADMIN — PROPOFOL 100 MG: 10 INJECTION, EMULSION INTRAVENOUS at 07:35

## 2023-11-15 RX ADMIN — LIDOCAINE HYDROCHLORIDE 5 ML: 20 INJECTION, SOLUTION EPIDURAL; INFILTRATION; INTRACAUDAL; PERINEURAL at 07:35

## 2023-11-15 RX ADMIN — PROPOFOL 250 MCG/KG/MIN: 10 INJECTION, EMULSION INTRAVENOUS at 07:35

## 2023-11-15 ASSESSMENT — PAIN SCALES - GENERAL: PAIN_LEVEL: 0

## 2023-11-15 NOTE — OP NOTE
_______________________________________________________________________________  Patient Name: Sumit Davis           Procedure Date: 11/15/2023 7:06 AM  MRN: [id number]                     Account Number: [id number]  YOB: 1955              Age: 68  Gender: Male                          Note Status: Finalized  Attending MD: SARAHI MELÉNDEZ MD,  _______________________________________________________________________________  Procedure:             Colonoscopy  Indications:           Personal history of malignant rectal neoplasm  Providers:             SARAHI MELÉNDEZ MD (Doctor)  Referring MD:          CRISTIAN MONTES MD  Requesting Provider:  Medicines:             Monitored Anesthesia Care  Complications:         No immediate complications.  _______________________________________________________________________________  Procedure:             After I obtained informed consent, the scope was  passed under direct vision. Throughout the procedure,  the patient's blood pressure, pulse, and oxygen  saturations were monitored continuously. The  colonoscope was introduced through the anus and  advanced to the cecum, identified by appendiceal  orifice and ileocecal valve. The colonoscopy was  performed without difficulty. The patient tolerated  the procedure well. The quality of the bowel  preparation was good.  Findings:  Anastomosis Normal without evidence of recurrence  The exam was otherwise normal throughout the examined colon.  Impression:            - No specimens collected.  Recommendation:        - Discharge patient to home.  - Repeat colonoscopy in 3 years.  Procedure Code(s):     --- Professional ---  66910, Colonoscopy, flexible; diagnostic, including  collection of specimen(s) by brushing or washing, when  performed (separate procedure)  Diagnosis Code(s):     --- Professional ---  Z85.048, Personal history of other malignant neoplasm  of rectum, rectosigmoid junction, and anus  CPT  copyright 2021 [employer]. All rights reserved.  The codes documented in this report are preliminary and upon  review may  be revised to meet current compliance requirements.  Attending Participation:  I personally performed the entire procedure.  Sarahi Meléndez MD  ________________  SARAHI MELÉNDEZ MD  11/15/2023 7:53:24 AM  This report has been signed electronically.  Number of Addenda: 0  Note Initiated On: 11/15/2023 7:06 AM

## 2023-11-15 NOTE — POST-OP (NON-BILLABLE)
Preoperative diagnosis: YPT2N0 adenocarcinoma the rectum status post laparoscopic ROSA (2005) status post preoperative chemoradiation    Postoperative diagnosis: Same no evidence of disease    Surgeon: Angel Whaley MD    Procedure: Colonoscopy to base the cecum    DD: 4.0    Findings: Normal coloanal anastomosis no evidence of recurrence; normal colocolonic anastomosis from stoma closure    Followup: Repeat colonoscopy in 3 years time

## 2023-11-15 NOTE — ANESTHESIOLOGIST PRE-PROCEDURE ATTESTATION
Pre-Procedure Patient Identification:  I am the Primary Anesthesiologist and have identified the patient on 11/15/23 at 7:14 AM.   I have confirmed the procedure(s) will be performed by the following surgeon/proceduralist Angel Whaley MD.

## 2023-11-15 NOTE — ANESTHESIA POSTPROCEDURE EVALUATION
Patient: Sumit Davis    Procedure Summary     Date: 11/15/23 Room / Location: [facility] 4 (D) / [facility]    Anesthesia Start: 0730 Anesthesia Stop: 0756    Procedure: DIAGNOSTIC FLEXIBLE COLONOSCOPY PROXIMAL TO SPLENIC FLEXURE WITH COLLECTION OF SPECIMEN BY WASHING AND COLON DECOMPRESSION (Anus) Diagnosis:       Screening for colon cancer      (COLONOSCOPY)    Providers: Angel Whaley MD Responsible Provider: Wander Lopez MD    Anesthesia Type: MAC ASA Status: 2          Anesthesia Type: MAC  PACU Vitals  11/15/2023 0752 - 11/15/2023 0852      11/15/2023  0754 11/15/2023  0800 11/15/2023  0815 11/15/2023  0821    BP: 93/54 95/58 148/76 136/76    Temp: 35.8 °C (96.5 °F) -- -- --    Pulse: 88 83 83 80    Resp: 17 23 18 23    SpO2: 98 % 97 % 98 % 99 %            Anesthesia Post Evaluation    Pain score: 0  Pain management: satisfactory to patient  Mode of pain management: IV medication  Patient location during evaluation: PACU  Patient participation: complete - patient participated  Level of consciousness: awake and alert  Cardiovascular status: acceptable  Airway Patency: adequate  Respiratory status: acceptable  Hydration status: stable  Anesthetic complications: no

## 2024-08-09 ENCOUNTER — TELEPHONE (OUTPATIENT)
Dept: COLORECTAL SURGERY | Facility: CLINIC | Age: 69
End: 2024-08-09
Payer: COMMERCIAL

## 2024-08-09 NOTE — TELEPHONE ENCOUNTER
PT wife Maria Del Rosario calling in concerns about her husbands hernia. She would like to speak to some for some advise

## (undated) DEVICE — SET TUBING/CAP HYBRID CO2 CLEVERCAP

## (undated) DEVICE — CONNECTOR PORT W/VALVE FOR OLYMPUS 160/180 SCOPE

## (undated) DEVICE — TUBE SUCTION 1/4INX20FT STERILE

## (undated) DEVICE — TUBE CONNECTOR CO2 OLYMPUS

## (undated) DEVICE — SOLN IV 0.9% NSS 1000ML